# Patient Record
(demographics unavailable — no encounter records)

---

## 2024-11-01 NOTE — HISTORY OF PRESENT ILLNESS
[FreeTextEntry1] : Problem: 1) BRIDGETT 2  Previous Therapies: 7/23/24 PAP ASCUS HPV pos (16/18 neg) 8/12/24 ECC            a) ECC: HGSIL/BRIDGETT 2 9/23/24- Aborted in office LEEP due to poor visualization of cervix

## 2024-11-01 NOTE — CHIEF COMPLAINT
[FreeTextEntry1] : New Consult- Patient was running late. She was asked to reschedule 10/22/24  46yo referred by **** for BRIDGETT 2. A LEEP was attempted in office but aborted 2' not being able to visualize the cervix.   OBHX: GYNHX:  PMHX: SX:  MED: ALL:  SOCIAL: FAM:    Last Mammogram: Last Colonoscopy:

## 2024-11-08 NOTE — ASSESSMENT
[FreeTextEntry1] : Cervix well visualized today.   With the aid of diagrams we reviewed the findings in detail.  We reviewed HPV its pathogenesis and the implications of an abnormal cervical cytology and the pathogenesis of dysplasia in detail.ASCUS with high-risk HPV is associated with 4% of Pap smears.  It has been reported that 40 to 58 percent of BRIDGETT 2 lesions will regress if left untreated, while 22 percent progress to BRIDGETT 3, and 5 percent progress to invasive cancer. ASCCP guidelines were reviewed with the patient. Excision procedure is recommended for both BRIDGETT 2 and CIN3.   The risks and benefits of LEEP vs. CKC were discussed which include, but are not limited to: bleeding, infection, cervical stenosis, cervical insufficiency. Possibility of needing a repeat procedure or hysterectomy was also discussed. I also discussed the possibility that no abnormality will be seen on the LEEP specimen.  Given large multiparous cervix and prolapsed vaginal walls, I feel a LEEP in the ambulatory setting is most appropriate for this patient.  [] Pre-op labs [] Medical clearance [] Pathology review [] LEEP @ [facility]

## 2024-11-08 NOTE — CHIEF COMPLAINT
[FreeTextEntry1] : New Consult- Patient was running late. She was asked to reschedule 10/22/24  46yo self-referred for BRIDGETT 2. [place] [facility] [place] but [place] 2' not being able to visualize the cervix denies abdominal pain, dyspareunia or bleeding with intercourse, changes in bowel or urinary habits.   OBHX:  x 2 GYNHX: LMP: irregular menses q 14 days + fibroid sitting by the cervix as per pt - last 10/2/24. h/o abnormal paps s/p colpo .   PMHX: Chronic asthmatic- no hospitalizations or intubations, mild psoriasis SX: tonsils  MED: advair, daily inhaler, albuterol prn, Montelukast ALL: NKDA  SOCIAL: social ETOH, quit Marijuana in 2024, no drugs.  FAM: mother: cervical cancer dx 45  within the year.    Last Mammogram: due for it.

## 2024-11-08 NOTE — ASSESSMENT
[FreeTextEntry1] : Cervix well visualized today.   With the aid of diagrams we reviewed the findings in detail.  We reviewed HPV its pathogenesis and the implications of an abnormal cervical cytology and the pathogenesis of dysplasia in detail.ASCUS with high-risk HPV is associated with 4% of Pap smears.  It has been reported that 40 to 58 percent of BRIDGETT 2 lesions will regress if left untreated, while 22 percent progress to BRIDGETT 3, and 5 percent progress to invasive cancer. ASCCP guidelines were reviewed with the patient. Excision procedure is recommended for both BRIDGETT 2 and CIN3.   The risks and benefits of LEEP vs. CKC were discussed which include, but are not limited to: bleeding, infection, cervical stenosis, cervical insufficiency. Possibility of needing a repeat procedure or hysterectomy was also discussed. I also discussed the possibility that no abnormality will be seen on the LEEP specimen.  Given large multiparous cervix and prolapsed vaginal walls, I feel a LEEP in the ambulatory setting is most appropriate for this patient.  [] Pre-op labs [] Medical clearance [] Pathology review [] LEEP @ [facility]

## 2024-11-08 NOTE — ASSESSMENT
[FreeTextEntry1] : Cervix well visualized today.   With the aid of diagrams we reviewed the findings in detail.  We reviewed HPV its pathogenesis and the implications of an abnormal cervical cytology and the pathogenesis of dysplasia in detail.ASCUS with high-risk HPV is associated with 4% of Pap smears.  It has been reported that 40 to 58 percent of BRIDGETT 2 lesions will regress if left untreated, while 22 percent progress to BRIDGETT 3, and 5 percent progress to invasive cancer. ASCCP guidelines were reviewed with the patient. Excision procedure is recommended for both BRIDGETT 2 and CIN3.   The risks and benefits of LEEP vs. CKC were discussed which include, but are not limited to: bleeding, infection, cervical stenosis, cervical insufficiency. Possibility of needing a repeat procedure or hysterectomy was also discussed. I also discussed the possibility that no abnormality will be seen on the LEEP specimen.  Given large multiparous cervix and prolapsed vaginal walls, I feel a LEEP in the ambulatory setting is most appropriate for this patient.  [] Pre-op labs [] Medical clearance [] Pathology review [] LEEP @ [facility]

## 2024-11-08 NOTE — CHIEF COMPLAINT
[FreeTextEntry1] : New Consult- Patient was running late. She was asked to reschedule 10/22/24  48yo self-referred for BRIDGETT 2. A LEEP was attempted in office at [facility] [place] in 2024 but aborted 2' not being able to visualize the cervix denies abdominal pain, dyspareunia or bleeding with intercourse, changes in bowel or urinary habits.   OBHX:  x 2 GYNHX: LMP: irregular menses q 14 days + fibroid sitting by the cervix as per pt - last 10/2/24. h/o abnormal paps s/p colpo .   PMHX: Chronic asthmatic- no hospitalizations or intubations, mild psoriasis SX: tonsils  MED: advair, daily inhaler, albuterol prn, Montelukast ALL: NKDA  SOCIAL: social ETOH, quit Marijuana in 2024, no drugs.  FAM: mother: cervical cancer dx 45  within the year.    Last Mammogram: due for it.

## 2024-11-08 NOTE — PHYSICAL EXAM
[Chaperone Present] : A chaperone was present in the examining room during all aspects of the physical examination [[id number]] : A chaperone was present during the pelvic exam. [Normal] : Anus and perineum: Normal sphincter tone, no masses, no prolapse. [de-identified] : well visualized with graves speculum. Multiparous. No masses

## 2024-11-08 NOTE — CHIEF COMPLAINT
[FreeTextEntry1] : New Consult- Patient was running late. She was asked to reschedule 10/22/24  46yo self-referred for BRIDGETT 2. [place] [facility] [place] 2' not being able to visualize the cervix denies abdominal pain, dyspareunia or bleeding with intercourse, changes in bowel or urinary habits.   OBHX:  x 2 GYNHX: LMP: irregular menses q 14 days + fibroid sitting by the cervix as per pt - last 10/2/24. h/o abnormal paps s/p colpo .   PMHX: Chronic asthmatic- no hospitalizations or intubations, mild psoriasis SX: tonsils  MED: advair, daily inhaler, albuterol prn, Montelukast ALL: NKDA  SOCIAL: social ETOH, quit Marijuana in 2024, no drugs.  FAM: mother: cervical cancer dx 45  within the year.    Last Mammogram: due for it.

## 2024-11-08 NOTE — PHYSICAL EXAM
[Chaperone Present] : A chaperone was present in the examining room during all aspects of the physical examination [[id number]] : A chaperone was present during the pelvic exam. [Normal] : Anus and perineum: Normal sphincter tone, no masses, no prolapse. [de-identified] : well visualized with graves speculum. Multiparous. No masses

## 2024-11-08 NOTE — PHYSICAL EXAM
[Chaperone Present] : A chaperone was present in the examining room during all aspects of the physical examination [[id number]] : A chaperone was present during the pelvic exam. [Normal] : Anus and perineum: Normal sphincter tone, no masses, no prolapse. [de-identified] : well visualized with graves speculum. Multiparous. No masses

## 2025-03-25 NOTE — REASON FOR VISIT
[FreeTextEntry1] : Patient NO-SHOWED for office procedure 1/21/25, 3/25/25  49yo here for Office LEEP for BRIDGETT 2.